# Patient Record
Sex: MALE | Race: WHITE
[De-identification: names, ages, dates, MRNs, and addresses within clinical notes are randomized per-mention and may not be internally consistent; named-entity substitution may affect disease eponyms.]

---

## 2018-02-28 ENCOUNTER — HOSPITAL ENCOUNTER (OUTPATIENT)
Dept: HOSPITAL 92 - SDC | Age: 78
Discharge: HOME | End: 2018-02-28
Attending: INTERNAL MEDICINE
Payer: MEDICARE

## 2018-02-28 VITALS — BODY MASS INDEX: 24.3 KG/M2

## 2018-02-28 DIAGNOSIS — I48.91: ICD-10-CM

## 2018-02-28 DIAGNOSIS — K52.9: ICD-10-CM

## 2018-02-28 DIAGNOSIS — K64.8: ICD-10-CM

## 2018-02-28 DIAGNOSIS — D50.9: ICD-10-CM

## 2018-02-28 DIAGNOSIS — Z86.718: ICD-10-CM

## 2018-02-28 DIAGNOSIS — K22.8: Primary | ICD-10-CM

## 2018-02-28 DIAGNOSIS — M19.90: ICD-10-CM

## 2018-02-28 DIAGNOSIS — K21.9: ICD-10-CM

## 2018-02-28 DIAGNOSIS — Z79.899: ICD-10-CM

## 2018-02-28 DIAGNOSIS — F17.210: ICD-10-CM

## 2018-02-28 DIAGNOSIS — K57.30: ICD-10-CM

## 2018-02-28 DIAGNOSIS — Z79.01: ICD-10-CM

## 2018-02-28 DIAGNOSIS — K44.9: ICD-10-CM

## 2018-02-28 DIAGNOSIS — I10: ICD-10-CM

## 2018-02-28 LAB
IRON SERPL-MCNC: 84 UG/DL (ref 65–175)
UIBC SERPL-MCNC: 336 MCG/DL (ref 261–462)
VIT B12 SERPL-MCNC: 524 PG/ML (ref 211–911)

## 2018-02-28 PROCEDURE — 0DBE8ZX EXCISION OF LARGE INTESTINE, VIA NATURAL OR ARTIFICIAL OPENING ENDOSCOPIC, DIAGNOSTIC: ICD-10-PCS | Performed by: INTERNAL MEDICINE

## 2018-02-28 PROCEDURE — 0D738ZZ DILATION OF LOWER ESOPHAGUS, VIA NATURAL OR ARTIFICIAL OPENING ENDOSCOPIC: ICD-10-PCS | Performed by: INTERNAL MEDICINE

## 2018-02-28 PROCEDURE — 43248 EGD GUIDE WIRE INSERTION: CPT

## 2018-02-28 PROCEDURE — 90471 IMMUNIZATION ADMIN: CPT

## 2018-02-28 PROCEDURE — 83540 ASSAY OF IRON: CPT

## 2018-02-28 PROCEDURE — 88305 TISSUE EXAM BY PATHOLOGIST: CPT

## 2018-02-28 PROCEDURE — 36415 COLL VENOUS BLD VENIPUNCTURE: CPT

## 2018-02-28 PROCEDURE — 43239 EGD BIOPSY SINGLE/MULTIPLE: CPT

## 2018-02-28 PROCEDURE — 45380 COLONOSCOPY AND BIOPSY: CPT

## 2018-02-28 PROCEDURE — 0DB98ZX EXCISION OF DUODENUM, VIA NATURAL OR ARTIFICIAL OPENING ENDOSCOPIC, DIAGNOSTIC: ICD-10-PCS | Performed by: INTERNAL MEDICINE

## 2018-02-28 PROCEDURE — 82746 ASSAY OF FOLIC ACID SERUM: CPT

## 2018-02-28 PROCEDURE — G0009 ADMIN PNEUMOCOCCAL VACCINE: HCPCS

## 2018-02-28 PROCEDURE — 83550 IRON BINDING TEST: CPT

## 2018-02-28 PROCEDURE — 90670 PCV13 VACCINE IM: CPT

## 2018-02-28 PROCEDURE — 82607 VITAMIN B-12: CPT

## 2018-02-28 PROCEDURE — 82728 ASSAY OF FERRITIN: CPT

## 2018-02-28 NOTE — OP
DATE OF PROCEDURE:  02/28/2018

 

PREOPERATIVE DIAGNOSES:  Mild dysphagia, gastroesophageal reflux disease, chronic diarrhea and microc
ytic anemia.

 

PROCEDURE IN DETAIL:  Informed consent was obtained from the patient.  He was sedated with total intr
avenous anesthesia.  The bite block was placed and the endoscope was advanced easily to the second po
rtion of the duodenum and retroflexion was performed in the stomach.  The esophagus was somewhat tort
uous consistent with presbyesophagus.  There was a mild Schatzki ring in the distal esophagus.  There
 was a small hiatal hernia present.  The esophagus was dilated to 16 mm with a Savary dilator over a 
guidewire.  The 18 mm dilator would not pass due to resistance at the level of the bite block.  On se
cond look endoscopy, there was no tear in the esophagus after dilation.  The stomach was otherwise no
rmal including retroflexed views.  The pylorus and first and second portions of the duodenum were nor
mal.  Random biopsies were taken from the duodenum to rule out celiac disease.  The patient was turne
d around.  Rectal exam was performed and was normal.  The preparation quality was good.  The colonosc
ope was advanced to the terminal ileum.  The mucosa of the terminal ileum was normal.  The ileocecal 
valve and appendiceal orifice were clearly identified.  There was severe diverticulosis of the sigmoi
d and descending colon.  There was mild diverticulosis of the transverse colon.  Random biopsies were
 obtained from the colon to rule out microscopic colitis.  There were some barotrauma in the ascendin
g colon and cecum which could reduce the sensitivity for evaluation for arteriovenous malformations. 
 No significant bleeding source was identified otherwise.  There were no polyps or protruding lesions
.  Retroflex views in the rectum revealed moderate internal hemorrhoids.

 

IMPRESSION:

1.  Presbyesophagus.

2.  Slight stricture in the distal esophagus dilated to 16 mm.  There was no change on second look.

3.  Small hiatal hernia.

4.  Otherwise normal esophagogastroduodenoscopy.  Duodenal biopsies taken to rule out celiac disease.


5.  Severe diverticulosis of the left colon with mild diverticulosis of the transverse colon.

6.  Moderate internal hemorrhoids.

7.  Otherwise normal colonoscopy to the terminal ileum.  Random biopsies were taken from the colon to
 rule out microscopic colitis.

 

RECOMMENDATIONS:

1.  Await histopathology.

2.  Start fiber supplement daily with Metamucil a tablespoon every day.

3.  Check iron, TIBC, ferritin, B12, and folate.

4.  Follow up in GI Clinic.

## 2018-11-02 ENCOUNTER — HOSPITAL ENCOUNTER (OUTPATIENT)
Dept: HOSPITAL 92 - LABBT | Age: 78
Discharge: HOME | End: 2018-11-02
Attending: ORTHOPAEDIC SURGERY
Payer: MEDICARE

## 2018-11-02 ENCOUNTER — HOSPITAL ENCOUNTER (INPATIENT)
Dept: HOSPITAL 92 - SJJU | Age: 78
LOS: 5 days | Discharge: HOME | DRG: 483 | End: 2018-11-07
Attending: ORTHOPAEDIC SURGERY | Admitting: ORTHOPAEDIC SURGERY
Payer: MEDICARE

## 2018-11-02 VITALS — BODY MASS INDEX: 24.3 KG/M2

## 2018-11-02 DIAGNOSIS — Z86.711: ICD-10-CM

## 2018-11-02 DIAGNOSIS — M10.9: ICD-10-CM

## 2018-11-02 DIAGNOSIS — M19.012: Primary | ICD-10-CM

## 2018-11-02 DIAGNOSIS — Z01.818: Primary | ICD-10-CM

## 2018-11-02 DIAGNOSIS — G89.29: ICD-10-CM

## 2018-11-02 DIAGNOSIS — Z86.718: ICD-10-CM

## 2018-11-02 DIAGNOSIS — M19.012: ICD-10-CM

## 2018-11-02 DIAGNOSIS — I48.91: ICD-10-CM

## 2018-11-02 DIAGNOSIS — Z79.02: ICD-10-CM

## 2018-11-02 LAB
ANION GAP SERPL CALC-SCNC: 12 MMOL/L (ref 10–20)
BUN SERPL-MCNC: 28 MG/DL (ref 8.4–25.7)
CALCIUM SERPL-MCNC: 9.2 MG/DL (ref 7.8–10.44)
CHLORIDE SERPL-SCNC: 106 MMOL/L (ref 98–107)
CO2 SERPL-SCNC: 25 MMOL/L (ref 23–31)
CREAT CL PREDICTED SERPL C-G-VRATE: 0 ML/MIN (ref 70–130)
CRYSTAL-AUWI FLAG: 0.1 (ref 0–15)
GLUCOSE SERPL-MCNC: 92 MG/DL (ref 83–110)
HEV IGM SER QL: 0.7 (ref 0–7.99)
HGB BLD-MCNC: 11.9 G/DL (ref 14–18)
HYALINE CASTS #/AREA URNS LPF: (no result) LPF
MCH RBC QN AUTO: 23 PG (ref 27–31)
MCV RBC AUTO: 72.6 FL (ref 78–98)
PATHC CAST-AUWI FLAG: 0 (ref 0–2.49)
PLATELET # BLD AUTO: 181 THOU/UL (ref 130–400)
POTASSIUM SERPL-SCNC: 4.3 MMOL/L (ref 3.5–5.1)
RBC # BLD AUTO: 5.15 MILL/UL (ref 4.7–6.1)
RBC UR QL AUTO: (no result) HPF (ref 0–3)
SODIUM SERPL-SCNC: 139 MMOL/L (ref 136–145)
SP GR UR STRIP: 1.01 (ref 1–1.04)
SPERM-AUWI FLAG: 0 (ref 0–9.9)
WBC # BLD AUTO: 6.7 THOU/UL (ref 4.8–10.8)
YEAST-AUWI FLAG: 0 (ref 0–25)

## 2018-11-02 PROCEDURE — A4306 DRUG DELIVERY SYSTEM <=50 ML: HCPCS

## 2018-11-02 PROCEDURE — 86850 RBC ANTIBODY SCREEN: CPT

## 2018-11-02 PROCEDURE — 87081 CULTURE SCREEN ONLY: CPT

## 2018-11-02 PROCEDURE — 93005 ELECTROCARDIOGRAM TRACING: CPT

## 2018-11-02 PROCEDURE — 86901 BLOOD TYPING SEROLOGIC RH(D): CPT

## 2018-11-02 PROCEDURE — 93010 ELECTROCARDIOGRAM REPORT: CPT

## 2018-11-02 PROCEDURE — 81001 URINALYSIS AUTO W/SCOPE: CPT

## 2018-11-02 PROCEDURE — 71045 X-RAY EXAM CHEST 1 VIEW: CPT

## 2018-11-02 PROCEDURE — 80048 BASIC METABOLIC PNL TOTAL CA: CPT

## 2018-11-02 PROCEDURE — 85027 COMPLETE CBC AUTOMATED: CPT

## 2018-11-02 PROCEDURE — 86900 BLOOD TYPING SEROLOGIC ABO: CPT

## 2018-11-05 NOTE — EKG
Test Reason : 

Blood Pressure : ***/*** mmHG

Vent. Rate : 075 BPM     Atrial Rate : 075 BPM

   P-R Int : 190 ms          QRS Dur : 092 ms

    QT Int : 430 ms       P-R-T Axes : 055 016 033 degrees

   QTc Int : 480 ms

 

Sinus rhythm Premature atrial complexes

Prolonged QT

Abnormal ECG

 

Confirmed by MADELINE FINE ([id number]) on 11/5/2018 11:51:54 AM

 

Referred By:  AMY           Confirmed By:MADELINE FINE

## 2018-11-06 PROCEDURE — 0RRK00Z REPLACEMENT OF LEFT SHOULDER JOINT WITH REVERSE BALL AND SOCKET SYNTHETIC SUBSTITUTE, OPEN APPROACH: ICD-10-PCS | Performed by: ORTHOPAEDIC SURGERY

## 2018-11-06 RX ADMIN — CEFAZOLIN SODIUM SCH GM: 2 SOLUTION INTRAVENOUS at 18:00

## 2018-11-06 RX ADMIN — HYDROCODONE BITARTRATE AND ACETAMINOPHEN PRN TAB: 7.5; 325 TABLET ORAL at 20:12

## 2018-11-06 NOTE — RAD
TWO VIEWS LEFT SHOULDER:

11/6/18

 

HISTORY: 

Post left shoulder surgery.

 

FINDINGS:  

There are postsurgical changes related to placement of left glenohumeral prosthesis. Skin clips are s
een overlying the left shoulder. There is subcutaneous emphysema related to recent postsurgical ignacio
es. Atelectasis is present at the left lung base with elevation of the left hemidiaphragm. 

 

IMPRESSION:  

Postsurgical changes related to recent left glenohumeral prosthesis. 

 

POS: [facility]

## 2018-11-06 NOTE — RAD
PORTABLE AP CHEST X-RAY:

11/6/18

 

HISTORY: 

Injury after fall on 11/5/18.

 

FINDINGS:  

There have been interval postsurgical changes involving the left shoulder with left glenohumeral pros
thesis noted. Skin clips are seen lateral to the left shoulder with subcutaneous emphysema likely rel
ated to recent postsurgical changes. 

 

There is elevation of the left hemidiaphragm with volume loss of the left lung base. The cardiac silh
ouette is magnified by projection but does appear enlarged. The thoracic aorta is ectatic. Pulmonary 
vasculature is within normal limits. Right lung is clear. There are small calcified densities seen ov
erlying the right scapula which may represent intra-articular loose bodies within the region of the s
ubcoracoid bursa. There is right glenohumeral osteoarthropathy. There is irregularity involving the s
uperior aspect of the right humeral head. Osteonecrosis cannot be entirely excluded. 

 

There is prominent right convexed scoliosis of the thoracic spine with degenerative changes in the sp
ine. 

 

IMPRESSION:  

1.      Sclerosis and irregularity involving the superior aspect of the right humeral head. Findings 
could be related to osteonecrosis. Dedicated views right shoulder may be helpful for further evaluati
on.

2.      Probable intra-articular loose bodies in the region of the right subcoracoid bursa. 

3.      Postsurgical changes left shoulder related to recent placement of left glenohumeral prosthesi
s. 

4.      Atelectasis left lung base with elevation left hemidiaphragm.

5.      Cardiomegaly. 

6.      Right convex scoliosis. 

 

 

 

POS: [facility]

## 2018-11-06 NOTE — OP
DATE OF PROCEDURE:  11/06/2018

 

PREOPERATIVE DIAGNOSIS:  Left rotator cuff arthropathy.

 

POSTOPERATIVE DIAGNOSIS:  Left rotator cuff arthropathy.

 

PROCEDURE PERFORMED:  Left reverse shoulder arthroplasty.

 

STAFF:  Neftali Finch M.D.

 

ASSISTANT:  Gloria Kim PA-C

 

ANESTHESIA:  [place].  The patient received a interscalene block.

 

ESTIMATED BLOOD LOSS:  200 mL.

 

TOURNIQUET TIME:  None.

 

IMPLANTS:  A Tornier 25 mm baseplate, a 36 x 25 mm centered glenosphere, a 6B Flex stem, a 36 mm x 9 
mm poly and low offset tray with 2 locking and 2 nonlocking screws.

 

ANTIBIOTICS:  Ancef 2 grams, vancomycin 1 gram.

 

COMPLICATIONS:  None.

 

HISTORY OF PRESENT ILLNESS:  Mr. Matt is a 78-year-old male who presented to me with atrial fibr
illation, history of DVT, PE on Eliquis.  I discussed with the patient the risks and benefits of his 
left shoulder pain.  I discussed he has rotator cuff arthropathy based on imaging.  I discussed the r
isks and benefits of left reverse shoulder arthroplasty to include pain, scar, bleeding, infection, d
amage to vital structures, nerves, arteries, tendons, decreased range of motion or strength, function
, pain with overhead activities.  I discussed with the patient that it will take him several weeks to
 recover.  I discussed with the patient all these risks and benefits, he elected to proceed.

 

PROCEDURE IN DETAIL:  Time out was performed designating the patient's left upper extremity as the op
erative site based on sight, consents, and markings.

 

After timeout was performed designating the patient's left upper extremity as the operative site base
d on sight, consents, markings.  The patient's deltopectoral interval was made down through skin and 
found the cephalic vein which was taken laterally, came down and took down a centimeter of the pector
dario, came into some tenuous bursal fluid and synovium that was redundant around the biceps as well a
s the joint.  Opened up and got a large flush of fluid.  Evacuated all that fluid from the joint spac
e.  Came down into the interval, excised some of the bursa, came down onto the lesser and took down t
he capsule inferiorly.  I had to ligate the patient's cephalic distally because it was buttonholed an
d I oversewed it to with a Vicryl stitch.  After completion of this, we excised the capsule inferiorl
y, exposed the humeral head, dislocated the humeral head.  I cut my humeral head.  I then started bro
aching, had 20 degrees of retroversion with internal opening guide followed by broaching.  I broached
 up to size 5, ultimately placed a 6 stem.  I milled down the head and cut the head to fit the stem. 
 After completion of this, we then moved to the glenoid.  I exposed the glenoid with a 360 released t
he patient's remnant biceps and labrum.  Blunt dissection inferiorly to expose the head, I used my gu
jasmin for 25 mm baseplate, placed a center inferiorly, centered inferiorly, drilled.  I then reamed carol
ing a little bit inferior aspect of the glenoid off.  After completion of this and cleaning up we izzy
yamilka my baseplate, placed my anterior and posterior nonlocking screws, placed my superior inferior loc
tutu screws had a good stable fix.  I then placed my implant in place, placed a 36 mm glenosphere int
o place, screwed it into position.  I then moved back to the stem.  I went up to 6B, trialed a 9 poly
.  The patient reduced well, had good overall range of motion of the shoulder.  I finally placed a 9 
mm with the 6B stem, created a drill hole for closing the remnant suture.  The patient's scar/whateve
r capsule subscapularis was left.  I sewed that down and then used some stitches to close the interva
l down to the bone with remnant soft tissue.  We had washed.  We again cauterized the vein to ensure 
it was no longer bleeding.  We then closed the deltopectoral interval with 0 and 2-0 Vicryl, closed t
he subq with 2-0 and closed the skin with staples.  He had some areas where the skin was a little bit
 bruised from the tear, we then placed the patient in a soft tissue dressing.  

 

The patient will be placed in a sling.  Elbow, wrist, and hand motion.  He will be followed in-house.
  We will start his Eliquis tomorrow.

## 2018-11-07 VITALS — SYSTOLIC BLOOD PRESSURE: 111 MMHG | DIASTOLIC BLOOD PRESSURE: 72 MMHG

## 2018-11-07 VITALS — TEMPERATURE: 98.9 F

## 2018-11-07 RX ADMIN — HYDROCODONE BITARTRATE AND ACETAMINOPHEN PRN TAB: 7.5; 325 TABLET ORAL at 11:19

## 2018-11-07 RX ADMIN — HYDROCODONE BITARTRATE AND ACETAMINOPHEN PRN TAB: 7.5; 325 TABLET ORAL at 00:13

## 2018-11-07 RX ADMIN — CEFAZOLIN SODIUM SCH GM: 2 SOLUTION INTRAVENOUS at 02:36

## 2018-11-12 ENCOUNTER — HOSPITAL ENCOUNTER (INPATIENT)
Dept: HOSPITAL 92 - ERS | Age: 78
LOS: 2 days | Discharge: HOME | DRG: 308 | End: 2018-11-14
Attending: INTERNAL MEDICINE | Admitting: INTERNAL MEDICINE
Payer: MEDICARE

## 2018-11-12 VITALS — BODY MASS INDEX: 25.3 KG/M2

## 2018-11-12 DIAGNOSIS — I11.0: ICD-10-CM

## 2018-11-12 DIAGNOSIS — I50.33: ICD-10-CM

## 2018-11-12 DIAGNOSIS — Z86.718: ICD-10-CM

## 2018-11-12 DIAGNOSIS — I48.92: Primary | ICD-10-CM

## 2018-11-12 DIAGNOSIS — Z79.01: ICD-10-CM

## 2018-11-12 DIAGNOSIS — Z96.611: ICD-10-CM

## 2018-11-12 DIAGNOSIS — Z79.899: ICD-10-CM

## 2018-11-12 DIAGNOSIS — Z96.653: ICD-10-CM

## 2018-11-12 DIAGNOSIS — Z86.711: ICD-10-CM

## 2018-11-12 DIAGNOSIS — I48.2: ICD-10-CM

## 2018-11-12 DIAGNOSIS — M19.90: ICD-10-CM

## 2018-11-12 LAB
ALBUMIN SERPL BCG-MCNC: 3.8 G/DL (ref 3.4–4.8)
ALP SERPL-CCNC: 108 U/L (ref 40–150)
ALT SERPL W P-5'-P-CCNC: 50 U/L (ref 8–55)
ANION GAP SERPL CALC-SCNC: 14 MMOL/L (ref 10–20)
AST SERPL-CCNC: 52 U/L (ref 5–34)
BASOPHILS # BLD AUTO: 0.1 THOU/UL (ref 0–0.2)
BASOPHILS NFR BLD AUTO: 0.9 % (ref 0–1)
BILIRUB SERPL-MCNC: 0.8 MG/DL (ref 0.2–1.2)
BUN SERPL-MCNC: 22 MG/DL (ref 8.4–25.7)
CALCIUM SERPL-MCNC: 9.1 MG/DL (ref 7.8–10.44)
CHLORIDE SERPL-SCNC: 105 MMOL/L (ref 98–107)
CK MB SERPL-MCNC: 3.3 NG/ML (ref 0–6.6)
CK SERPL-CCNC: 108 U/L (ref 30–200)
CO2 SERPL-SCNC: 22 MMOL/L (ref 23–31)
CREAT CL PREDICTED SERPL C-G-VRATE: 0 ML/MIN (ref 70–130)
EOSINOPHIL # BLD AUTO: 0.1 THOU/UL (ref 0–0.7)
EOSINOPHIL NFR BLD AUTO: 1.8 % (ref 0–10)
GLOBULIN SER CALC-MCNC: 3.4 G/DL (ref 2.4–3.5)
GLUCOSE SERPL-MCNC: 100 MG/DL (ref 83–110)
HGB BLD-MCNC: 10.2 G/DL (ref 14–18)
LYMPHOCYTES # BLD: 2 THOU/UL (ref 1.2–3.4)
LYMPHOCYTES NFR BLD AUTO: 25.4 % (ref 21–51)
MCH RBC QN AUTO: 23.2 PG (ref 27–31)
MCV RBC AUTO: 73.1 FL (ref 78–98)
MONOCYTES # BLD AUTO: 0.7 THOU/UL (ref 0.11–0.59)
MONOCYTES NFR BLD AUTO: 9.2 % (ref 0–10)
NEUTROPHILS # BLD AUTO: 5 THOU/UL (ref 1.4–6.5)
NEUTROPHILS NFR BLD AUTO: 62.7 % (ref 42–75)
PLATELET # BLD AUTO: 278 THOU/UL (ref 130–400)
POTASSIUM SERPL-SCNC: 4.5 MMOL/L (ref 3.5–5.1)
RBC # BLD AUTO: 4.38 MILL/UL (ref 4.7–6.1)
SODIUM SERPL-SCNC: 136 MMOL/L (ref 136–145)
TROPONIN I SERPL DL<=0.01 NG/ML-MCNC: 0.08 NG/ML (ref ?–0.03)
WBC # BLD AUTO: 7.9 THOU/UL (ref 4.8–10.8)

## 2018-11-12 PROCEDURE — 92960 CARDIOVERSION ELECTRIC EXT: CPT

## 2018-11-12 PROCEDURE — 93005 ELECTROCARDIOGRAM TRACING: CPT

## 2018-11-12 PROCEDURE — 80048 BASIC METABOLIC PNL TOTAL CA: CPT

## 2018-11-12 PROCEDURE — 82553 CREATINE MB FRACTION: CPT

## 2018-11-12 PROCEDURE — 85018 HEMOGLOBIN: CPT

## 2018-11-12 PROCEDURE — 71045 X-RAY EXAM CHEST 1 VIEW: CPT

## 2018-11-12 PROCEDURE — 96374 THER/PROPH/DIAG INJ IV PUSH: CPT

## 2018-11-12 PROCEDURE — 96361 HYDRATE IV INFUSION ADD-ON: CPT

## 2018-11-12 PROCEDURE — 85025 COMPLETE CBC W/AUTO DIFF WBC: CPT

## 2018-11-12 PROCEDURE — 36415 COLL VENOUS BLD VENIPUNCTURE: CPT

## 2018-11-12 PROCEDURE — 93312 ECHO TRANSESOPHAGEAL: CPT

## 2018-11-12 PROCEDURE — 80053 COMPREHEN METABOLIC PANEL: CPT

## 2018-11-12 PROCEDURE — 83735 ASSAY OF MAGNESIUM: CPT

## 2018-11-12 PROCEDURE — 82565 ASSAY OF CREATININE: CPT

## 2018-11-12 PROCEDURE — 84484 ASSAY OF TROPONIN QUANT: CPT

## 2018-11-12 PROCEDURE — 93306 TTE W/DOPPLER COMPLETE: CPT

## 2018-11-12 PROCEDURE — 85014 HEMATOCRIT: CPT

## 2018-11-12 PROCEDURE — 85049 AUTOMATED PLATELET COUNT: CPT

## 2018-11-12 PROCEDURE — 82550 ASSAY OF CK (CPK): CPT

## 2018-11-12 PROCEDURE — 71275 CT ANGIOGRAPHY CHEST: CPT

## 2018-11-12 PROCEDURE — 83880 ASSAY OF NATRIURETIC PEPTIDE: CPT

## 2018-11-12 RX ADMIN — HYDROCODONE BITARTRATE AND ACETAMINOPHEN PRN TAB: 5; 325 TABLET ORAL at 21:44

## 2018-11-12 NOTE — RAD
CHEST 1 VIEW:

 

Date:  11/12/18 

 

HISTORY:  

Shortness of breath. 

 

COMPARISON:  

Radiograph dated 11/06/18. 

 

FINDINGS:

There is a new left shoulder reverse arthroplasty. Lungs are mildly hypoinflated with some atelectati
c changes. This is similar. No pneumothorax. No effusion. There is a sliding hiatal hernia. Severe de
xtroscoliosis. 

 

IMPRESSION: 

Chronic changes. No acute abnormality. 

 

 

POS: [facility]

## 2018-11-12 NOTE — CT
CT ANGIOGRAM CHEST WITH CONTRAST:

 

Date:  11/12/18 

 

HISTORY:  

Chest pain. Shortness of breath. Recent shoulder surgery. 

 

COMPARISON:  

Chest radiograph same date. 

 

TECHNIQUE:  

CT angiogram chest performed after the intravenous administration of contrast. 3D rendering is provid
ed. 

 

FINDINGS:

Recent postoperative changes of the left shoulder. Severe degenerative changes of the right shoulder.
 No proximal segmental pulmonary arterial filling defect. Pulmonary trunk size is not enlarged. No pe
ricardial effusion. There is reflux of contrast within the suprahepatic IVC, as well as within the he
patic veins. Mild tortuosity of the aorta without aneurysmal dilatation. 

 

There are scoliotic changes of the thoracic spine and severe dextroscoliosis. Some mild atelectatic c
hanges within both lower lobes, as well as within the lingula. There is some bronchiectasis within danette
th lower lobes which may be sequelae of prior infection or aspiration. 

 

No pneumothorax. No significant effusion. No acute osseous abnormality. 

 

IMPRESSION: 

1.  No proximal segmental pulmonary arterial filling defect. 

2.  Mild atelectatic changes lung bases. No evidence for pneumonia. 

3.  Reflux of contrast within the suprahepatic IVC as well as hepatic veins suggesting diastolic dysf
unction. Nonemergent echocardiogram may be beneficial in this patient. 

 

 

POS: [facility]

## 2018-11-13 LAB
ANION GAP SERPL CALC-SCNC: 13 MMOL/L (ref 10–20)
BUN SERPL-MCNC: 20 MG/DL (ref 8.4–25.7)
CALCIUM SERPL-MCNC: 8.7 MG/DL (ref 7.8–10.44)
CHLORIDE SERPL-SCNC: 106 MMOL/L (ref 98–107)
CO2 SERPL-SCNC: 21 MMOL/L (ref 23–31)
CREAT CL PREDICTED SERPL C-G-VRATE: 73 ML/MIN (ref 70–130)
GLUCOSE SERPL-MCNC: 96 MG/DL (ref 83–110)
HGB BLD-MCNC: 9.4 G/DL (ref 14–18)
MAGNESIUM SERPL-MCNC: 2 MG/DL (ref 1.6–2.6)
MCH RBC QN AUTO: 23.1 PG (ref 27–31)
MCV RBC AUTO: 74.3 FL (ref 78–98)
MDIFF COMPLETE?: YES
PLATELET # BLD AUTO: 247 THOU/UL (ref 130–400)
PLATELET BLD QL SMEAR: (no result)
POLYCHROMASIA BLD QL SMEAR: (no result) (100X)
POTASSIUM SERPL-SCNC: 4.2 MMOL/L (ref 3.5–5.1)
RBC # BLD AUTO: 4.05 MILL/UL (ref 4.7–6.1)
SODIUM SERPL-SCNC: 136 MMOL/L (ref 136–145)
TARGETS BLD QL SMEAR: (no result) (100X)
WBC # BLD AUTO: 5.4 THOU/UL (ref 4.8–10.8)

## 2018-11-13 RX ADMIN — HYDROCODONE BITARTRATE AND ACETAMINOPHEN PRN TAB: 5; 325 TABLET ORAL at 17:07

## 2018-11-13 RX ADMIN — HYDROCODONE BITARTRATE AND ACETAMINOPHEN PRN TAB: 5; 325 TABLET ORAL at 07:17

## 2018-11-13 RX ADMIN — HYDROCODONE BITARTRATE AND ACETAMINOPHEN PRN TAB: 5; 325 TABLET ORAL at 21:19

## 2018-11-13 NOTE — PDOC.PN
- Subjective


Encounter Start Date: 11/13/18


Encounter Start Time: 11:45





- Objective


Resuscitation Status: 


 











Resuscitation Status           FULL:Full Resuscitation














MAR Reviewed: Yes


Vital Signs & Weight: 


 Vital Signs (12 hours)











  Temp Pulse Resp BP BP Pulse Ox


 


 11/13/18 11:33  98.7 F  83  14   100/60  96


 


 11/13/18 08:00  98.0 F  104 H  14  110/65   95


 


 11/13/18 04:00  98.0 F  81  18  117/69   94 L








 Weight











Weight                         164 lb 4.8 oz














I&O: 


 











 11/12/18 11/13/18 11/14/18





 06:59 06:59 06:59


 


Intake Total  120 


 


Output Total  275 


 


Balance  -155 











Result Diagrams: 


 11/13/18 05:08





 11/13/18 05:08





Dx/Plan


(1) Acute on chronic diastolic CHF (congestive heart failure)


Code(s): I50.33 - ACUTE ON CHRONIC DIASTOLIC (CONGESTIVE) HEART FAILURE   Status

: Acute   





(2) Atrial flutter


Code(s): I48.92 - UNSPECIFIED ATRIAL FLUTTER   Status: Acute   





(3) HTN (hypertension)


Code(s): I10 - ESSENTIAL (PRIMARY) HYPERTENSION   Status: Acute   





(4) History of pulmonary embolus (PE)


Code(s): Z86.711 - PERSONAL HISTORY OF PULMONARY EMBOLISM   Status: Acute   





- Plan


cont current plan of care





* .


seen by jerry for AMAN cardioversion in AM.

## 2018-11-13 NOTE — CON
DATE OF CONSULTATION:  11/13/2018

 

REASON FOR CONSULTATION:  Atrial arrhythmias.

 

REFERRING PHYSICIAN:  Dr. Bob Skaggs

 

HISTORY OF PRESENT ILLNESS:  Mr. Matt is a very pleasant 78-year-old gentleman who was previousl
y known to our practice for atrial fibrillation.  He underwent pulmonary venous isolation ablation ap
proximately 10 years ago with one of my associates, Dr. Woods.  He is usually followed by his EP do
karen who now works for [employer].  He does report that he had a recurrence of atrial fibrillation in [place] of this year and required a cardioversion for his arrhythmia.  He is kept on Eliquis for lifelong
 anticoagulation for history of recurrent DVTs and pulmonary embolisms.  

 

Mr. Matt presented to the hospital this occurrence for reported shortness of breath with exertio
n, dyspnea on exertion and shortness of breath that started 1 week ago.  He had a shoulder replacemen
t last Tuesday.  He does not have any additional symptoms associated with this shortness of breath, b
ut does find that is worse with exertion.  It is unchanged by position whether recumbent or upright. 
 He denies any heart racing, palpitations, chest pain, pressure, syncope, near syncope, stroke or str
katie like symptoms or additional heart failure symptoms.  He denies any swelling of his extremities.

 

REVIEW OF SYSTEMS:  

CONSTITUTIONAL:  Negative for fevers, chills, malaise, or unintentional weight loss.

CARDIOVASCULAR:  As per HPI.

RESPIRATORY:  Positive for shortness of breath and dyspnea on exertion.  Negative for wheezing, cough
 or productive cough.

GASTROINTESTINAL:  Negative for nausea, vomiting, diarrhea.

GENITOURINARY:  Negative for frequency, hesitancy or blood in the urine.

NEUROLOGIC:  Negative for unilateral weakness, numbness, tingling or migraines.

 

PAST MEDICAL HISTORY:

1.  Hypertension.

2.  Bilateral deep venous thromboses.

3.  Bilateral pulmonary embolisms.

4.  Atrial fibrillation, status post PVAI approximately in 2008 with Dr. Woods.

 

PAST SURGICAL HISTORY:

1.  Left shoulder replacement 11/2018.

2.  Scoliosis.

 

SOCIAL HISTORY:  Negative for tobacco or illicit drug use.  Positive for daily alcohol intake less th
an 5 drinks per day.  Resides at home.

 

FAMILY HISTORY:  Negative for sudden cardiac death or early onset coronary artery disease.

 

HOME MEDICATIONS:  Colchicine 1 tab as directed, Imodium 2 mg daily, Norco as needed, Lasix 20 mg p.o
. q.a.m., Lomotil 2.5 mg as needed, Eliquis 5 mg p.o. b.i.d., amiodarone 200 mg p.o. q.a.m., allopuri
nol 600 mg p.o. at bedtime, potassium 10 mEq daily, metoprolol 25 mg daily, losartan 1 tab p.o. q.a.m
., celecoxib 200 mg p.o. daily.

 

PHYSICAL EXAMINATION:

VITAL SIGNS:  Most recent vital signs include 98.0, heart rate 104, blood pressure 110/65, respiratio
ns 14, oxygen is 95% on room air.

GENERAL:  This is a well-appearing, well-groomed and well-nourished elderly gentleman in no apparent 
distress.  His speech is clear.  His affect is appropriate.  He is a good historian.

NECK:  Supple without jugular venous distention.  His thyroid is nonpalpable.

LUNGS:  Clear to auscultation bilaterally without wheezes, crackles or rhonchi.

CARDIOVASCULAR:  His heart rate is currently irregularly irregular and somewhat rapid.  His PMI is no
ndisplaced.  There is no obvious murmur, rub or gallop appreciated.

ABDOMEN:  Soft, nontender without palpable masses.  Hepatojugular reflux is negative.

EXTREMITIES:  Warm and dry to touch without clubbing, cyanosis or edema.

NEUROLOGIC:  Grossly intact and nonfocal.  Gait was not assessed.

 

DATABASE:  Hematology review is reviewed and is unremarkable except mildly reduced hemoglobin and hem
atocrit at 9.4 and 30.1 respectively.  Chemistry panel:  Potassium 4.2, creatinine 0.91.  .

 

Echocardiogram:  EF 50-55%, moderate to severe concentric left ventricular hypertrophy.

 

TELEMETRY AND EKGs.  All were personally reviewed and reflect atypical atrial flutter with occasional
 RVR and very variable AV conduction.

 

IMPRESSION:  

1.  Atypical atrial flutter with occasional rapid ventricular response.

2.  History of atrial fibrillation, status post ablation in the remote past.

3.  Chronic anticoagulation for both atrial arrhythmias and recurrent deep venous thromboses and pulm
onary embolisms with Eliquis 5 mg p.o. b.i.d.

4.  Dyspnea with exertion.

5.  Congestive heart failure with mildly elevated BNP, euvolemic by exam.

6.  Hypertrophic cardiomyopathy with a preserved ejection fraction of 55-60%.

 

RECOMMENDATIONS:

1.  Continue Eliquis for stroke prophylaxis.

2.  AMAN cardioversion in the morning at 8:30.

3.  Follow up with [facility] to discuss further treatment options or with his established cardiologist in [facility].

4.  Continue amiodarone for antiarrhythmic therapy.

## 2018-11-14 VITALS — DIASTOLIC BLOOD PRESSURE: 64 MMHG | SYSTOLIC BLOOD PRESSURE: 118 MMHG

## 2018-11-14 VITALS — TEMPERATURE: 97.8 F

## 2018-11-14 LAB
CREAT CL PREDICTED SERPL C-G-VRATE: 62 ML/MIN (ref 70–130)
HGB BLD-MCNC: 10.1 G/DL (ref 14–18)
PLATELET # BLD AUTO: 286 THOU/UL (ref 130–400)

## 2018-11-14 RX ADMIN — HYDROCODONE BITARTRATE AND ACETAMINOPHEN PRN TAB: 5; 325 TABLET ORAL at 11:49

## 2018-11-14 NOTE — DIS
DATE OF ADMISSION:  11/12/2018

 

DATE OF DISCHARGE:  11/14/2018

 

DISCHARGE DIAGNOSES:

1.  Atrial flutter.

2.  Left atrial clot.

3.  Acute on chronic diastolic congestive heart failure.

4.  History of deep vein thrombosis and pulmonary embolism on chronic anticoagulation.

5.  Chronic atrial fibrillation.

6.  Degenerative joint disease.

 

CONSULTATIONS:  Electrophysiology, Dr. Mayer.

 

PROCEDURES:   Transesophageal echocardiogram on 11/14/2018 that showed some very small amount of left
 atrial clot, so no cardioversion was done.

 

HISTORY AND PHYSICAL:  Mr. Matt is a 78-year-old gentleman who presented to the emergency depart
ment 11/12/2018 with shortness of breath that had been going on for about a week.  Workup showed atri
al flutter and pulmonary edema with elevated BNP.  We were called for admission.

 

HOSPITAL COURSE:  The patient was seen and examined by me in the emergency department, placed an inpa
tient.  He underwent diuresis and the following morning I asked the electrophysiologist to see him.  
He was seen by Dr. Mayer who made him n.p.o. with plans for AMAN and cardioversion in the morning to be
 started on Eliquis.  The patient's rate was better on digoxin that was started that evening.  By 11/
14/2018, he went to [facility], but did have a small amount of clot, so Dr. Mayer did not feel comfortable ca
rdioverting him.  He was recommended started on aspirin 81 mg daily.  Continue on digoxin and Eliquis
 and follow him up in a couple of weeks for repeat look to see if the clot had dissolved. The patient
 is otherwise stable for discharge with outpatient followup with his heart rate better controlled in 
the 80s.

 

PHYSICAL EXAMINATION:  The patient was seen and examined on the day of discharge.  Discharge plan and
 disposition was discussed with the patient and his wife face to face at the bedside.

 

DISCHARGE MEDICATIONS:  New medications:  

1.  Aspirin 81 mg daily.

2.  Digoxin 0.125 mg p.o. daily.

 

HOME MEDICATIONS TO CONTINUE:  

1.  Eliquis 5 mg p.o. b.i.d.

2.  Potassium chloride 10 mEq p.o. daily.

3.  Metoprolol tartrate 25 mg p.o. daily.

4.  Losartan 50 mg daily.

5.  Celebrex 200 mg p.o. t.i.d.

6.  Allopurinol 100 mg p.o. q.p.m.

 

MEDICATIONS DISCONTINUED:  Amiodarone 200 mg daily.

 

FOLLOWUP APPOINTMENTS:

1.  Primary care physician in 1 week.

2.  Dr. Mayer in 2-3 weeks.

 

DISCHARGE CONDITION:  Stable.

 

DISPOSITION:  Discharged to home via private vehicle.

 

DISCHARGE ACTIVITY:  Per cardiopulmonary limits.

 

DISCHARGE DIET:  Heart healthy recommended.

## 2018-11-14 NOTE — HP
DATE OF ADMISSION:  11/12/2018

 

PRIMARY CARE PHYSICIAN:  Hua Bhatia M.D.

 

PRIMARY CARDIOLOGIST:  In [facility] [employer].

 

TIME OF SERVICE:  1700 hours.

 

CHIEF COMPLAINT:  Shortness of breath.

 

HISTORY OF PRESENT ILLNESS:  Mr. Matt is a 78-year-old gentleman with a history of chronic atria
l fibrillation, status post ablation in the past that apparently failed and remains in chronic atrial
 fibrillation.  He is followed by Cardiology in [facility] [employer] [facility].  He has had about a 1-week history of shortne
ss of breath that has been going on since he was discharged last week after shoulder replacement abou
t a week prior to presentation.  No chest pain or nausea and vomiting.  No palpitations.  No syncope 
or presyncope.  He denies any orthopnea, no PND.

 

Denies any fevers or chills.  No nausea, vomiting, diarrhea, constipation.

 

Workup in the ER showed a BNP elevated in the 700s, his creatinine was normal.  Blood counts remained
 normal and chest x-ray showed some pulmonary edema.  We were subsequently called for admission.

 

Patient had no further history.

 

PAST MEDICAL HISTORY:

1.  Hypertension.

2.  History of bilateral lower extremity DVTs.

3.  History of PEs.

4.  History of chronic atrial fibrillation, status post cardioversion and ablation in the past.

 

PAST SURGICAL HISTORY:

1.  Bilateral total knee replacements.

2.  Right shoulder repair, left shoulder surgery recently.

3.  Laminectomy.

4.  Cardiac ablation.

 

MEDICATIONS:

1.  Allopurinol 100 mg p.o. q.p.m.

2.  Celebrex 200 mg p.o. t.i.d.

3.  Losartan 100 mg daily, recently decreased to 50 mg daily.

4.  Metoprolol succinate 25 mg p.o. q.a.m.

5.  Potassium chloride 10 mEq p.o. q.p.m.

6.  Eliquis 5 mg p.o. b.i.d.

7.  Amiodarone 200 mg daily.

 

ALLERGIES:  NKDA.

 

FAMILY HISTORY:  Negative for clotting or bleeding disorders.  No immune dysfunction.

 

SOCIAL HISTORY:  Negative for habits x3.  He is , monogamous.  His wife is not with him at pre
sent.

 

REVIEW OF SYSTEMS:  All systems are reviewed and negative except as per HPI.

 

PHYSICAL EXAMINATION:

VITAL SIGNS:  Temperature 97.7, pulse 86, blood pressure 144/99, respiratory rate 14.  Oxygen saturat
ion is not recorded, but per the nurse's note, he was not hypoxemic.

HEENT:  Normocephalic and atraumatic.  Pupils are equal, react to light bilaterally.  Mucous membrane
s moist.  No visible lesion.  No thrush.

NECK:  Supple.  No lymphadenopathy, JVD, or thyromegaly.

LUNGS:  Clear to auscultation anteriorly and posteriorly has some bilateral crackles up to about mid-
level.  They do not clear with deep inspiration.  There is no prolonged expiratory phase.  No wheezes
.

ABDOMEN:  Soft, is nontender, nondistended.

CARDIOVASCULAR:  Irregularly irregular.  He has no audible murmurs.

EXTREMITIES:  No cyanosis, no clubbing with trace pedal edema.

SKIN:  Warm and well perfused without rashes or lesions.

MUSCULOSKELETAL:  Normal to inspection.  Large joints appear normal.  The right shoulder recently ope
rated on appears normal, without any palpable effusions.  He has got good range of motion.

NEUROLOGIC:  Cranial nerves II-XII are grossly intact.  He has no focal deficits, normal speech and 5
/5 strength in his extremities.

 

LABORATORY DATA:  Sodium is 136, potassium 4.5, chloride 105, bicarb 22, BUN 22, creatinine 1.15, glu
cose 100, calcium 9.1.  Liver function within normal limits.  His CBC showed a white count of 7.9, he
moglobin 16.2, hematocrit 32.0, platelet count is 278,000.  BNP was elevated at 703.9 and troponin I 
was indeterminate at 0.076.

 

IMAGING:  Chest x-ray did show some pulmonary edema.

 

EKG showed atrial flutter with variable block.

 

ASSESSMENT AND PLAN:

1.  Atrial flutter with rapid ventricular response to variable block.  We will monitor him overnight.
  We will see his cardiac biomarkers.  I will continue his home medications and monitor his response.
  We will put him on some IV Lasix.

2.  Acute on chronic diastolic congestive heart failure.  He reports that his heart is "twice as thic
k as normal" based on his last echocardiogram.  We do not have that result here.  We will go and gent
ly diurese him tonight and see how he responds.

3.  Essential hypertension, slightly elevated right now.  We will continue his home medications.

4.  History of deep venous thrombosis and pulmonary embolism, he is on Eliquis.  We will continue thi
s.

5.  History of chronic atrial fibrillation, not currently in atrial flutter with variable block.  May
 ask EP to see in the morning.

## 2018-11-14 NOTE — ECHO
REASON FOR PROCEDURE: The patient is a 78-year-old man with prior history of atrial fibrillation, randy
or ablation Dr. Woods at [facility] in the past about 10 years ago.  Now is back with rec
urrent atrial fibrillation/flutter.

 

He appears to be atypical flutter.  He has been anticoagulated with Eliquis 5 mg twice a day, but has
 had some interruption for shoulder surgery about a week ago.  Now has presented in irregular rhythm.
  He has been started on 10 mg of amiodarone. Here for a AMAN guided cardioversion.

 

PROCEDURE:  The patient received propofol by Anesthesia specialist.  After adequate level of sedation
 achieved, a standard transesophageal echocardiogram probe was passed into the esophagus without diff
iculty.  Patient tolerated the procedure well, no complications noted.

 

RESULTS:

1.  The left atrium is a moderately enlarged about 5.2 cm in horizontal diameter.  Left atrial append
age is well visualized contains heavy spontaneous echo contrast and early formation of clot cannot co
mpletely ruled out.  The appendage velocities are reduced at 20 cm per second.

2.  Four of four pulmonary veins were seen. Left upper pulmonary veins are smaller in diameter than n
ormal. Velocity though is not elevated.  The right side pulmonary veins are not well visualized.  

3.  The interatrial septum is free of defect. 

4.  Mitral valve has mild regurgitation.  

5.  Left ventricular function is preserved. Left ventricular wall thickness is increased circumferent
ially up to 1.8 cm in the inferior wall is documented.  The right chambers nondilated. Tricuspid valv
e has mild regurgitation.  

6.  Aortic valve not regurgitant, three leaflets and not stenotic.

7.  Pericardial space without effusion

8.  Visualized portion the ascending and descending aortic without aneurysm, dissection or significan
t atheroma.

 

CONCLUSION:

1.  Heavy spontaneous echo contrast throughout the left atrium and left atrial appendage with early f
ormation of clot cannot be completely ruled out.

2.  Moderate left atrial enlargement.

3.  Mild mitral regurgitation and tricuspid regurgitation.  No other significant valvular heart disea
se noted.

4.  Preserved LV systolic function.

5.  Moderate left ventricular hypertrophy present.

 

PLAN:  Continue anticoagulation for 4 months. For now, we will amiodarone and consider adding digoxin
 with the rate control and maximize metoprolol therapy.

 

He might be a candidate for ablation procedure after full month of anticoagulation and after repeat C
T performed not demonstrating clots in the left atrium and evaluate left pulmonary venous structures.


 

PLAN: Follow up in the office.